# Patient Record
Sex: FEMALE | Race: OTHER | URBAN - METROPOLITAN AREA
[De-identification: names, ages, dates, MRNs, and addresses within clinical notes are randomized per-mention and may not be internally consistent; named-entity substitution may affect disease eponyms.]

---

## 2024-03-18 ENCOUNTER — OFFICE VISIT (OUTPATIENT)
Dept: FAMILY MEDICINE CLINIC | Facility: CLINIC | Age: 59
End: 2024-03-18

## 2024-03-18 ENCOUNTER — HOSPITAL ENCOUNTER (OUTPATIENT)
Dept: RADIOLOGY | Facility: HOSPITAL | Age: 59
Discharge: HOME | End: 2024-03-18

## 2024-03-18 VITALS
SYSTOLIC BLOOD PRESSURE: 130 MMHG | WEIGHT: 183 LBS | RESPIRATION RATE: 16 BRPM | OXYGEN SATURATION: 99 % | DIASTOLIC BLOOD PRESSURE: 70 MMHG | TEMPERATURE: 98.7 F | HEART RATE: 101 BPM

## 2024-03-18 DIAGNOSIS — M79.604 RIGHT LEG PAIN: Primary | ICD-10-CM

## 2024-03-18 DIAGNOSIS — M79.604 RIGHT LEG PAIN: ICD-10-CM

## 2024-03-18 PROCEDURE — 73590 X-RAY EXAM OF LOWER LEG: CPT

## 2024-03-18 PROCEDURE — 99213 OFFICE O/P EST LOW 20 MIN: CPT | Performed by: FAMILY MEDICINE

## 2024-03-18 RX ORDER — NAPROXEN 500 MG/1
500 TABLET ORAL 2 TIMES DAILY WITH MEALS
Qty: 30 TABLET | Refills: 0 | Status: SHIPPED | OUTPATIENT
Start: 2024-03-18

## 2024-03-18 NOTE — ASSESSMENT & PLAN NOTE
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  Patient denies history if DVT, PE, recent travel, numbness, tingleness, pins and needles sensation, chest pain, sob, exposure to sick contacts, fevers, chills.  DDX right leg pain being musckuloskeltal in origin vs arthritis.     -Naproxen 500 mg bid  -xray of right leg  -elevate, rest and put ice of right leg   -F/U with pcp

## 2024-03-18 NOTE — PROGRESS NOTES
Name: Vika Shen      : 1965      MRN: [id number]  Encounter Provider: [facility]  Encounter Date: 3/18/2024   Encounter department: [facility]    Assessment & Plan     1. 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  Patient denies history if DVT, PE, recent travel, numbness, tingleness, pins and needles sensation, chest pain, sob, exposure to sick contacts, fevers, chills.  DDX right leg pain being musckuloskeltal in origin vs arthritis.     -Naproxen 500 mg bid  -xray of right leg  -elevate, rest and put ice of right leg   -F/U with pcp     Orders:  -     naproxen (Naprosyn) 500 mg tablet; Take 1 tablet (500 mg total) by mouth 2 (two) times a day with meals  -     XR tibia fibula 2 vw right; Future; Expected date: 2024           Subjective      Vika Shen is a 60 yo female who presents to clinic today due right leg pain. Patient reports she works at a [employer] and she is required to wear specific heavy shoes. Afetr she started wearing these shoes she noticed the pain of her right left leg.  Patient reports she is bothered by the shoand is [id number] changing it.  Patient denies recent falls, trauma, travel, history of DVT or Pulmonary embolism.  Patient denies chest pain, sob, palpitations, numbness, tingleness, pins and needle sensation.    Leg Pain       Review of Systems   Constitutional:  Negative for chills and fever.   HENT:  Negative for ear pain and sore throat.    Eyes:  Negative for pain and visual disturbance.   Respiratory:  Negative for cough and shortness of breath.    Cardiovascular:  Negative for chest pain and palpitations.   Gastrointestinal:  Negative for abdominal pain and vomiting.   Genitourinary:  Negative for dysuria and hematuria.   Musculoskeletal:  Negative for arthralgias  and back pain.        Right leg pain    Skin:  Negative for color change and rash.   Neurological:  Negative for seizures and syncope.   All other systems reviewed and are negative.      No current outpatient medications on file prior to visit.       Objective     /70 (BP Location: Right arm, Patient Position: Sitting, Cuff Size: Large)   Pulse 101   Temp 98.7 °F (37.1 °C) (Temporal)   Resp 16   Wt 83 kg (183 lb)   SpO2 99%   Breastfeeding No     Physical Exam  Vitals reviewed.   HENT:      Head: Normocephalic.      Right Ear: External ear normal.      Left Ear: External ear normal.      Nose: Nose normal.      Mouth/Throat:      Mouth: Mucous membranes are moist.   Cardiovascular:      Rate and Rhythm: Normal rate and regular rhythm.   Pulmonary:      Effort: Pulmonary effort is normal.      Breath sounds: Normal breath sounds.   Abdominal:      General: Bowel sounds are normal.   Musculoskeletal:         General: Normal range of motion.      Comments: No right leg tenderness upon palpation. No signs of infection such as warmth, erythema or swelling  Jabari sign negative    Skin:     General: Skin is warm.   Neurological:      General: No focal deficit present.      Mental Status: She is alert. Mental status is at baseline.       [facility]

## 2024-03-18 NOTE — LETTER
March 18, 2024     Patient: Vika Shen  YOB: 1965  Date of Visit: 3/18/2024      To Whom it May Concern:    Vika Shen is under my professional care. Vika was seen in my office on 3/18/2024. Vika may return to work on 03/25/24 . Patient will require a a plantar base to be placed on shoe for support during working hours     If you have any questions or concerns, please don't hesitate to call.         Sincerely,          [facility]        CC: No Recipients

## 2024-03-22 ENCOUNTER — TELEPHONE (OUTPATIENT)
Dept: FAMILY MEDICINE CLINIC | Facility: CLINIC | Age: 59
End: 2024-03-22

## 2024-03-22 NOTE — TELEPHONE ENCOUNTER
PCP SIGNATURE NEEDED FOR [employer] FORM RECEIVED VIA FAX AND PLACED IN PCP FOLDER TO BE DELIVERED AT ASSIGNED TIMES.     Disability and FMLA Medical certification

## 2024-03-25 DIAGNOSIS — M79.604 RIGHT LEG PAIN: Primary | ICD-10-CM

## 2024-03-25 NOTE — TELEPHONE ENCOUNTER
FAXED ON 03/25/24 TO [facility] [employer] at [phone]. FAX CONFIRMATION RECEIVED.     Put in  bin under ( J )

## 2024-03-25 NOTE — TELEPHONE ENCOUNTER
Placed signed and completed form in to be faxed bin folder on preceptor room. Also patient might come in and request original FMLA form signed document. Thank you

## 2024-03-27 ENCOUNTER — TELEPHONE (OUTPATIENT)
Dept: FAMILY MEDICINE CLINIC | Facility: CLINIC | Age: 59
End: 2024-03-27

## 2024-03-27 NOTE — TELEPHONE ENCOUNTER
PCP SIGNATURE NEEDED FOR [employer]   FORM RECEIVED VIA FAX AND PLACED IN PCP FOLDER TO BE DELIVERED AT ASSIGNED TIMES.         Additional information

## 2024-04-01 ENCOUNTER — TELEPHONE (OUTPATIENT)
Dept: FAMILY MEDICINE CLINIC | Facility: CLINIC | Age: 59
End: 2024-04-01

## 2024-04-01 NOTE — TELEPHONE ENCOUNTER
Fax received asking for information on service date 3/15/24. Patient was not seen here on that date. Form was signed to inform [employer] of this. Confirmation received, scanned into chart.

## 2024-04-22 ENCOUNTER — OFFICE VISIT (OUTPATIENT)
Dept: FAMILY MEDICINE CLINIC | Facility: CLINIC | Age: 59
End: 2024-04-22

## 2024-04-22 VITALS
OXYGEN SATURATION: 98 % | RESPIRATION RATE: 18 BRPM | WEIGHT: 184 LBS | SYSTOLIC BLOOD PRESSURE: 125 MMHG | HEART RATE: 96 BPM | TEMPERATURE: 97.6 F | HEIGHT: 62 IN | DIASTOLIC BLOOD PRESSURE: 80 MMHG | BODY MASS INDEX: 33.86 KG/M2

## 2024-04-22 DIAGNOSIS — E66.9 CLASS 1 OBESITY WITHOUT SERIOUS COMORBIDITY WITH BODY MASS INDEX (BMI) OF 33.0 TO 33.9 IN ADULT, UNSPECIFIED OBESITY TYPE: ICD-10-CM

## 2024-04-22 DIAGNOSIS — M79.604 RIGHT LEG PAIN: ICD-10-CM

## 2024-04-22 DIAGNOSIS — Z12.11 SCREENING FOR MALIGNANT NEOPLASM OF COLON: ICD-10-CM

## 2024-04-22 DIAGNOSIS — Z00.00 ANNUAL PHYSICAL EXAM: Primary | ICD-10-CM

## 2024-04-22 DIAGNOSIS — E04.9 GOITER: ICD-10-CM

## 2024-04-22 PROCEDURE — 99213 OFFICE O/P EST LOW 20 MIN: CPT | Performed by: FAMILY MEDICINE

## 2024-04-22 PROCEDURE — 99396 PREV VISIT EST AGE 40-64: CPT | Performed by: FAMILY MEDICINE

## 2024-04-22 NOTE — ASSESSMENT & PLAN NOTE
Denies PMH last time check up >5 years ago  Denies smoking, alcohol drugs  No surgeries  A0 Vaginal spontaneous  Physical exam unremarkable except for asymptmatic goiter    PLAN:  Lifestyle modifications for weight loss  PAP smear  Mammogram  Colonoscopy  TSH

## 2024-04-23 NOTE — PROGRESS NOTES
Name: Vika Shen      : 1965      MRN: [id number]  Encounter Provider: Tila Negron MD  Encounter Date: 2024   Encounter department: [facility]    Assessment & Plan     1. Annual physical exam  Assessment & Plan:  Denies PMH last time check up >5 years ago  Denies smoking, alcohol drugs  No surgeries  A0 Vaginal spontaneous  Physical exam unremarkable except for asymptmatic goiter    PLAN:  Lifestyle modifications for weight loss  PAP smear  Mammogram  Colonoscopy  TSH      Orders:  -     Lipid panel; Future  -     Comprehensive metabolic panel; Future  -     Ambulatory Referral to Gastroenterology; Future  -     TSH, 3rd generation with Free T4 reflex; Future    2. Goiter  -     TSH, 3rd generation with Free T4 reflex; Future    3. Screening for malignant neoplasm of colon  -     Ambulatory Referral to Gastroenterology; Future    4. Class 1 obesity without serious comorbidity with body mass index (BMI) of 33.0 to 33.9 in adult, unspecified obesity type  -     Lipid panel; Future      BMI Counseling: Body mass index is 33.65 kg/m². The BMI is above normal. Nutrition recommendations include decreasing portion sizes, decreasing fast food intake, consuming healthier snacks and moderation in carbohydrate intake. Exercise recommendations include moderate physical activity 150 minutes/week. No pharmacotherapy was ordered. Rationale for BMI follow-up plan is due to patient being overweight or obese.         Subjective      Patient presented today to establish care, she doesn't have any past medical history. She was in the office recently and saw dr coffey for right leg pain. After seeing dr coffey pain improved, she stopped using the shoes that where causing her pain, and used naproxen with significant improvement on pain. Xray was done negative.  She presented today to establish care, physical exam revealed asymptomatic goiter, on palpation right  "thyroid larger than left. Suggested thyroid us but patient doesn't have insurance, she will try to get blood work done in [facility] and establish care there.        Review of Systems   Constitutional:  Negative for chills and fever.   HENT:  Negative for ear pain and sore throat.    Eyes:  Negative for pain and visual disturbance.   Respiratory:  Negative for cough and shortness of breath.    Cardiovascular:  Negative for chest pain and palpitations.   Gastrointestinal:  Negative for abdominal pain and vomiting.   Genitourinary:  Negative for dysuria and hematuria.   Musculoskeletal:  Negative for arthralgias and back pain.   Skin:  Negative for color change and rash.   Allergic/Immunologic: Negative for immunocompromised state.   Neurological:  Negative for seizures and syncope.   All other systems reviewed and are negative.      Current Outpatient Medications on File Prior to Visit   Medication Sig    naproxen (Naprosyn) 500 mg tablet Take 1 tablet (500 mg total) by mouth 2 (two) times a day with meals       Objective     /80 (BP Location: Left arm, Patient Position: Sitting, Cuff Size: Standard)   Pulse 96   Temp 97.6 °F (36.4 °C) (Temporal)   Resp 18   Ht 5' 2\" (1.575 m)   Wt 83.5 kg (184 lb)   SpO2 98%   BMI 33.65 kg/m²     Physical Exam  Constitutional:       Appearance: Normal appearance. She is normal weight.   HENT:      Head: Normocephalic and atraumatic.      Nose: Nose normal.      Mouth/Throat:      Mouth: Mucous membranes are moist.   Eyes:      Extraocular Movements: Extraocular movements intact.      Conjunctiva/sclera: Conjunctivae normal.      Pupils: Pupils are equal, round, and reactive to light.   Cardiovascular:      Rate and Rhythm: Normal rate and regular rhythm.      Pulses: Normal pulses.      Heart sounds: Normal heart sounds.   Pulmonary:      Effort: Pulmonary effort is normal.      Breath sounds: Normal breath sounds.   Abdominal:      General: Bowel sounds " are normal. There is no distension.      Palpations: Abdomen is soft.      Tenderness: There is no abdominal tenderness. There is no guarding or rebound.   Musculoskeletal:         General: Normal range of motion.      Cervical back: Normal range of motion.   Skin:     General: Skin is warm.      Capillary Refill: Capillary refill takes less than 2 seconds.   Neurological:      General: No focal deficit present.      Mental Status: She is alert and oriented to person, place, and time.   Psychiatric:         Mood and Affect: Mood normal.         Behavior: Behavior normal.       Tila Negron MD

## 2024-04-25 NOTE — ASSESSMENT & PLAN NOTE
Patient presented to the office 3/27 for right leg pain that improved after naproxen/ changing shoes she wears for work.  She is still c/o pain but is more intermittent  Couldn't describe pain when asked if cramp/numbness not sure    PLAN:  Compression stockings  Make sure adequate hydration while working  CMP to assess for electrolyte abnormalities

## 2024-12-04 ENCOUNTER — TELEPHONE (OUTPATIENT)
Dept: OTHER | Facility: OTHER | Age: 59
End: 2024-12-04

## 2024-12-04 NOTE — TELEPHONE ENCOUNTER
Progress Note:  Left message for patient to return call for assistance with making appointments or getting access to medical care.    Mammogram Screening ([facility]):  Pap smear screening ([facility]inic vs [facility]):  PCP ([facility] vs [facility]):     Transportation:     Education: